# Patient Record
(demographics unavailable — no encounter records)

---

## 2025-01-09 NOTE — HISTORY OF PRESENT ILLNESS
[FreeTextEntry1] : 18 yo M with MS (high burden of T2 lesions in brain and C-T cord) and incidental pituitary cysts on Ritux (getting infusions in [facility]) Exam concerning for mild L side weakness, brisk patellar DTRs bi, L upgoing toe, decreased vibration mike, mildly spastic gait, questionable L dysmetria and difficult tandem. EDSS 2.5.    HPI  2 weeks prior to hospital admission in May 2023, he developed R sided blurry vision with dizziness and gait imbalance (he was leaning too much in one direction). Saw an optometrist for the blurriness, w/ normal exam, but optometrist sent him to neuro for further evaluation.  In the meantime, he also developed L eye blurriness and mild intermittent HAs.    Saw outside neurologist (Dr Regina June) who ordered MRI brain/orbits that came with widespread lesions enhancing/non enhancing and some subtle T2 hyperintensities in bi ON R>L. Pt sent to [facility] for further w/u.   - Brain/orbits MRI ():  # Widespread oval T2 FLAIR lesions in PV and SC WM, CC, brainstem, deep cerebellum, external  and internal capsules, many of them enhancing.  #  T2 FLAIR hyperintensity in orbital segment R ON, intracanalicular/ IC segments of both ON R>L and chiasm R>L.   # Pituitary cysts: 5 mm R side and 3 mm on the L.   - MR spine (): Multiple T2 signal abnormalities involve the cervical spinal cord and the thoracic spinal cord. Enhancing lesion at C2 level, R side.   - LP not done - MOG neg - NMO neg - Ophthalmology saw him- No evidence of ON or SALVATORE. Incidental OS retinal hole.  - Endocrinology consulted for pituitary cyst vs adenoma. No concerns for IV steroid treatment. F/U oupt  Patient completed 5 day course of on methylprednisolone (-) with improvement of symptoms.    PMHx: Normal . FT. Normal development.  Epidural abscess s/p laminectomy in 2017 Cholecystectomy [employer]   FMHx: No autoimmune or neurologic conditions in the family   INTERVAL HX - Received last dose of Ritux 1 year ago!!! - Got MRI brain/spine 2024. New L renate lesion. Stable C-T lesions.  - Denies new weakness, numbness/tingling, visual problems, ataxia, bladder/bowel issues - Saw Dr Waters 2023 with normal exam and no evidence of ON or SALVATORE. Needs f/u - Saw Endo . Labs ordered. Asymptomatic from pituitary lesion.  - A little more compliant with Vit D but still forgets some days - Mood is better. No therapy - Exercise- trying to go to gym - Sleep- better with melatonin

## 2025-01-09 NOTE — DATA REVIEWED
[FreeTextEntry1] : Pyramidal- 2\par  Cerebellum- 2\par  Brainstem- 1\par  Sensory- 1\par  Bladder/Bowel- 0\par  Vision- 0\par  Cognition- 0\par  \par  EDSS 2.5

## 2025-01-09 NOTE — ASSESSMENT
[FreeTextEntry1] : 18 yo M with MS (multiple T2 lesions in brain and C-T cord) and a recent new lesion in the renate (2024) here for f/u.  Last Ritux dose   Exam concerning for mild L side weakness, brisk patellar DTRs bi, L upgoing toe, decreased vibration mike, mildly spastic gait, questionable L dysmetria and difficult tandem. EDSS 2.5.   Pt's dad unexpectedly  Dec 2023 and he just moved from [facility] to [place] to stay with his family. Mood and sleep improved.  Poor compliance with Vit D and exercise. Reinforce need of life style changes and compliance with Vit D and infusions.

## 2025-01-09 NOTE — PHYSICAL EXAM
[Well-appearing] : well-appearing [Normocephalic] : normocephalic [No dysmorphic facial features] : no dysmorphic facial features [No ocular abnormalities] : no ocular abnormalities [Neck supple] : neck supple [No abnormal neurocutaneous stigmata or skin lesions] : no abnormal neurocutaneous stigmata or skin lesions [No deformities] : no deformities [Alert] : alert [Well related, good eye contact] : well related, good eye contact [Conversant] : conversant [Normal speech and language] : normal speech and language [Follows instructions well] : follows instructions well [Pupils reactive to light and accommodation] : pupils reactive to light and accommodation [Full extraocular movements] : full extraocular movements [Saccadic and smooth pursuits intact] : saccadic and smooth pursuits intact [No nystagmus] : no nystagmus [No papilledema] : no papilledema [Normal facial sensation to light touch] : normal facial sensation to light touch [No facial asymmetry or weakness] : no facial asymmetry or weakness [Gross hearing intact] : gross hearing intact [Equal palate elevation] : equal palate elevation [Good shoulder shrug] : good shoulder shrug [Normal tongue movement] : normal tongue movement [Midline tongue, no fasciculations] : midline tongue, no fasciculations [Normal axial and appendicular muscle tone] : normal axial and appendicular muscle tone [Gets up on table without difficulty] : gets up on table without difficulty [No pronator drift] : no pronator drift [Normal finger tapping and fine finger movements] : normal finger tapping and fine finger movements [No abnormal involuntary movements] : no abnormal involuntary movements [Able to do deep knee bend] : able to do deep knee bend [Able to walk on heels] : able to walk on heels [Able to walk on toes] : able to walk on toes [Knee jerks] : knee jerks [Ankle jerks] : ankle jerks [Localizes LT and temperature] : localizes LT and temperature [Good walking balance] : good walking balance [Negative Romberg] : negative Romberg [de-identified] : no distress [de-identified] : brisk patellas bi L worse than R [de-identified] : 2 beats on the R, 3 on the L [de-identified] : mute toes [de-identified] : questionable dysmetria on the L? Mildly spastic gait. Tandem difficult

## 2025-01-09 NOTE — PLAN
[FreeTextEntry1] : [] Vit D 2000 IU daily [] Needs f/u appt Neuro-ophto [] Needs repeat MRIs [] Bloodwork today-- needs next Ritux URGENTLY [] F/U in person in 4 months.

## 2025-05-12 NOTE — PHYSICAL EXAM
[Well-appearing] : well-appearing [Normocephalic] : normocephalic [No dysmorphic facial features] : no dysmorphic facial features [No ocular abnormalities] : no ocular abnormalities [Neck supple] : neck supple [No abnormal neurocutaneous stigmata or skin lesions] : no abnormal neurocutaneous stigmata or skin lesions [No deformities] : no deformities [Alert] : alert [Well related, good eye contact] : well related, good eye contact [Conversant] : conversant [Normal speech and language] : normal speech and language [Follows instructions well] : follows instructions well [Pupils reactive to light and accommodation] : pupils reactive to light and accommodation [Full extraocular movements] : full extraocular movements [Saccadic and smooth pursuits intact] : saccadic and smooth pursuits intact [No nystagmus] : no nystagmus [No papilledema] : no papilledema [Normal facial sensation to light touch] : normal facial sensation to light touch [No facial asymmetry or weakness] : no facial asymmetry or weakness [Gross hearing intact] : gross hearing intact [Equal palate elevation] : equal palate elevation [Good shoulder shrug] : good shoulder shrug [Normal tongue movement] : normal tongue movement [Midline tongue, no fasciculations] : midline tongue, no fasciculations [Normal axial and appendicular muscle tone] : normal axial and appendicular muscle tone [Gets up on table without difficulty] : gets up on table without difficulty [No pronator drift] : no pronator drift [Normal finger tapping and fine finger movements] : normal finger tapping and fine finger movements [No abnormal involuntary movements] : no abnormal involuntary movements [Able to do deep knee bend] : able to do deep knee bend [Able to walk on heels] : able to walk on heels [Able to walk on toes] : able to walk on toes [Knee jerks] : knee jerks [Ankle jerks] : ankle jerks [Localizes LT and temperature] : localizes LT and temperature [Good walking balance] : good walking balance [Negative Romberg] : negative Romberg [de-identified] : no distress [de-identified] : brisk patellas bi L worse than R [de-identified] : 2 beats on the R, 3 on the L [de-identified] : mute toes [de-identified] : questionable dysmetria on the L? Mildly spastic gait. Tandem difficult

## 2025-05-12 NOTE — PHYSICAL EXAM
[Well-appearing] : well-appearing [Normocephalic] : normocephalic [No dysmorphic facial features] : no dysmorphic facial features [No ocular abnormalities] : no ocular abnormalities [Neck supple] : neck supple [No abnormal neurocutaneous stigmata or skin lesions] : no abnormal neurocutaneous stigmata or skin lesions [No deformities] : no deformities [Alert] : alert [Well related, good eye contact] : well related, good eye contact [Conversant] : conversant [Normal speech and language] : normal speech and language [Follows instructions well] : follows instructions well [Pupils reactive to light and accommodation] : pupils reactive to light and accommodation [Full extraocular movements] : full extraocular movements [Saccadic and smooth pursuits intact] : saccadic and smooth pursuits intact [No nystagmus] : no nystagmus [No papilledema] : no papilledema [Normal facial sensation to light touch] : normal facial sensation to light touch [No facial asymmetry or weakness] : no facial asymmetry or weakness [Gross hearing intact] : gross hearing intact [Equal palate elevation] : equal palate elevation [Good shoulder shrug] : good shoulder shrug [Normal tongue movement] : normal tongue movement [Midline tongue, no fasciculations] : midline tongue, no fasciculations [Normal axial and appendicular muscle tone] : normal axial and appendicular muscle tone [Gets up on table without difficulty] : gets up on table without difficulty [No pronator drift] : no pronator drift [Normal finger tapping and fine finger movements] : normal finger tapping and fine finger movements [No abnormal involuntary movements] : no abnormal involuntary movements [Able to do deep knee bend] : able to do deep knee bend [Able to walk on heels] : able to walk on heels [Able to walk on toes] : able to walk on toes [Knee jerks] : knee jerks [Ankle jerks] : ankle jerks [Localizes LT and temperature] : localizes LT and temperature [Good walking balance] : good walking balance [Negative Romberg] : negative Romberg [de-identified] : no distress [de-identified] : brisk patellas bi L worse than R [de-identified] : 2 beats on the R, 3 on the L [de-identified] : mute toes [de-identified] : questionable dysmetria on the L? Mildly spastic gait. Tandem difficult

## 2025-05-12 NOTE — ASSESSMENT
[FreeTextEntry1] : 20 yo M with MS (multiple T2 lesions in brain and C-T cord) and a recent new lesion in the renate (2024) here for f/u.  Last Ritux dose   Exam concerning for mild L side weakness, brisk patellar DTRs bi, L upgoing toe, decreased vibration mike, mildly spastic gait, questionable L dysmetria and difficult tandem. EDSS 2.5.   Pt's dad unexpectedly  Dec 2023 and he just moved from [facility] to [place] to stay with his family. Mood and sleep improved.  Poor compliance with Vit D and exercise. Reinforce need of life style changes and compliance with Vit D and infusions.

## 2025-05-12 NOTE — HISTORY OF PRESENT ILLNESS
[FreeTextEntry1] : 21yo M with MS (high burden of T2 lesions in brain and C-T cord) and incidental pituitary cysts (normal Endo labs) on Ritux.  Lost f/u and missed Ritux for 1 year in . Repeat MRI 2025 with worsening lesions both in brain and C spine compared to 2024.  Exam concerning for mild L side weakness, brisk patellar DTRs bi, L upgoing toe, decreased vibration mike, mildly spastic gait, questionable L dysmetria and difficult tandem. EDSS 2.5.    HPI  2 weeks prior to hospital admission in May 2023, he developed R sided blurry vision with dizziness and gait imbalance (he was leaning too much in one direction). Saw an optometrist for the blurriness, w/ normal exam, but optometrist sent him to neuro for further evaluation.  In the meantime, he also developed L eye blurriness and mild intermittent HAs.    Saw outside neurologist (Dr Regina June) who ordered MRI brain/orbits that came with widespread lesions enhancing/non enhancing and some subtle T2 hyperintensities in bi ON R>L. Pt sent to [facility] for further w/u.   - Brain/orbits MRI ():  # Widespread oval T2 FLAIR lesions in PV and SC WM, CC, brainstem, deep cerebellum, external  and internal capsules, many of them enhancing.  #  T2 FLAIR hyperintensity in orbital segment R ON, intracanalicular/ IC segments of both ON R>L and chiasm R>L.   # Pituitary cysts: 5 mm R side and 3 mm on the L.   - MR spine (): Multiple T2 signal abnormalities involve the cervical spinal cord and the thoracic spinal cord. Enhancing lesion at C2 level, R side.   - LP not done - MOG neg - NMO neg - Ophthalmology saw him- No evidence of ON or SALVATORE. Incidental OS retinal hole.  - Endocrinology consulted for pituitary cyst vs adenoma. No concerns for IV steroid treatment. F/U oupt  Patient completed 5 day course of on methylprednisolone (-) with improvement of symptoms.    PMHx: Normal . FT. Normal development.  Epidural abscess s/p laminectomy in 2017 Cholecystectomy [employer]   FMHx: No autoimmune or neurologic conditions in the family   INTERVAL HX - Denies new weakness, numbness/tingling, visual problems, ataxia, bladder/bowel issues - Saw Dr Waters 2023 with normal exam and no evidence of ON or SALVATORE. Needs f/u___ - Last Ritux__ - Work__ - Mood__ - Exercise-  - Sleep- better with melatonin

## 2025-05-12 NOTE — HISTORY OF PRESENT ILLNESS
[FreeTextEntry1] : 21yo M with MS (high burden of T2 lesions in brain and C-T cord) and incidental pituitary cysts (normal Endo labs) on Ritux.  Lost f/u and missed Ritux for 1 year in . Repeat MRI 2025 with worsening lesions both in brain and C spine compared to 2024.  Exam concerning for mild L side weakness, brisk patellar DTRs bi, L upgoing toe, decreased vibration mike, mildly spastic gait, questionable L dysmetria and difficult tandem. EDSS 2.5.    HPI  2 weeks prior to hospital admission in May 2023, he developed R sided blurry vision with dizziness and gait imbalance (he was leaning too much in one direction). Saw an optometrist for the blurriness, w/ normal exam, but optometrist sent him to neuro for further evaluation.  In the meantime, he also developed L eye blurriness and mild intermittent HAs.    Saw outside neurologist (Dr Regina June) who ordered MRI brain/orbits that came with widespread lesions enhancing/non enhancing and some subtle T2 hyperintensities in bi ON R>L. Pt sent to [facility] for further w/u.   - Brain/orbits MRI ():  # Widespread oval T2 FLAIR lesions in PV and SC WM, CC, brainstem, deep cerebellum, external  and internal capsules, many of them enhancing.  #  T2 FLAIR hyperintensity in orbital segment R ON, intracanalicular/ IC segments of both ON R>L and chiasm R>L.   # Pituitary cysts: 5 mm R side and 3 mm on the L.   - MR spine (): Multiple T2 signal abnormalities involve the cervical spinal cord and the thoracic spinal cord. Enhancing lesion at C2 level, R side.   - LP not done - MOG neg - NMO neg - Ophthalmology saw him- No evidence of ON or SALVATORE. Incidental OS retinal hole.  - Endocrinology consulted for pituitary cyst vs adenoma. No concerns for IV steroid treatment. F/U oupt  Patient completed 5 day course of on methylprednisolone (-) with improvement of symptoms.    PMHx: Normal . FT. Normal development.  Epidural abscess s/p laminectomy in 2017 Cholecystectomy [employer]   FMHx: No autoimmune or neurologic conditions in the family   INTERVAL HX - Denies new weakness, numbness/tingling, visual problems, ataxia, bladder/bowel issues - Saw Dr Waters 2023 with normal exam and no evidence of ON or SALVATORE. Needs f/u___ - Last Ritux__ - Work__ - Mood__ - Exercise-  - Sleep- better with melatonin

## 2025-05-12 NOTE — ASSESSMENT
[FreeTextEntry1] : 20 yo M with MS (multiple T2 lesions in brain and C-T cord) and a recent new lesion in the renate (2024) here for f/u.  Last Ritux dose   Exam concerning for mild L side weakness, brisk patellar DTRs bi, L upgoing toe, decreased vibration mike, mildly spastic gait, questionable L dysmetria and difficult tandem. EDSS 2.5.   Pt's dad unexpectedly  Dec 2023 and he just moved from [facility] to [place] to stay with his family. Mood and sleep improved.  Poor compliance with Vit D and exercise. Reinforce need of life style changes and compliance with Vit D and infusions.